# Patient Record
Sex: FEMALE | Race: WHITE
[De-identification: names, ages, dates, MRNs, and addresses within clinical notes are randomized per-mention and may not be internally consistent; named-entity substitution may affect disease eponyms.]

---

## 2018-09-15 ENCOUNTER — HOSPITAL ENCOUNTER (EMERGENCY)
Dept: HOSPITAL 46 - ED | Age: 53
Discharge: HOME | End: 2018-09-15
Payer: MEDICARE

## 2018-09-15 VITALS — WEIGHT: 189.99 LBS | BODY MASS INDEX: 33.66 KG/M2 | HEIGHT: 63 IN
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
## 2018-09-15 DIAGNOSIS — S63.501A: Primary | ICD-10-CM
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
## 2018-09-15 PROCEDURE — 2W3CX1Z IMMOBILIZATION OF RIGHT LOWER ARM USING SPLINT: ICD-10-PCS

## 2019-04-04 ENCOUNTER — HOSPITAL ENCOUNTER (INPATIENT)
Dept: HOSPITAL 46 - MS | Age: 54
LOS: 1 days | Discharge: HOME | DRG: 389 | End: 2019-04-05
Attending: SURGERY | Admitting: SURGERY
Payer: MEDICARE

## 2019-04-04 VITALS — BODY MASS INDEX: 32.12 KG/M2 | WEIGHT: 181.31 LBS | HEIGHT: 63 IN

## 2019-04-04 DIAGNOSIS — G89.29: ICD-10-CM

## 2019-04-04 DIAGNOSIS — J30.9: ICD-10-CM

## 2019-04-04 DIAGNOSIS — G62.9: ICD-10-CM

## 2019-04-04 DIAGNOSIS — E03.9: ICD-10-CM

## 2019-04-04 DIAGNOSIS — Z79.52: ICD-10-CM

## 2019-04-04 DIAGNOSIS — C56.9: ICD-10-CM

## 2019-04-04 DIAGNOSIS — Z87.891: ICD-10-CM

## 2019-04-04 DIAGNOSIS — K57.90: ICD-10-CM

## 2019-04-04 DIAGNOSIS — C79.9: ICD-10-CM

## 2019-04-04 DIAGNOSIS — Z90.710: ICD-10-CM

## 2019-04-04 DIAGNOSIS — K56.600: Primary | ICD-10-CM

## 2019-04-04 DIAGNOSIS — T45.1X5D: ICD-10-CM

## 2019-04-04 DIAGNOSIS — Z79.899: ICD-10-CM

## 2019-04-04 DIAGNOSIS — R60.0: ICD-10-CM

## 2019-04-04 DIAGNOSIS — I10: ICD-10-CM

## 2019-04-04 DIAGNOSIS — F32.9: ICD-10-CM

## 2019-04-04 DIAGNOSIS — F41.9: ICD-10-CM

## 2019-04-04 DIAGNOSIS — G43.909: ICD-10-CM

## 2019-04-04 DIAGNOSIS — Z79.891: ICD-10-CM

## 2019-04-04 DIAGNOSIS — B49: ICD-10-CM

## 2019-04-04 PROCEDURE — C9113 INJ PANTOPRAZOLE SODIUM, VIA: HCPCS

## 2019-04-04 NOTE — NUR
PT REPORTING HEADACHE. TYLENOL SUPPOSITORY ADMINISTERED. 0.5MG IV DILAUDID FOR
ABDOMNIAL PAIN, PT WITH NAUSEA, ZOFRAN ADMINISTERED.

## 2019-04-04 NOTE — NUR
PT ARRIVED TO FLOOR VIA STRETCHER, FROM [facility] WITH 2L MASK IN
PLACE, NG TUBE CLAMPED. PT WITH 7/10 ABDOMINAL PAIN. 0.5 MG DILAUDID
ADMINISTERED. NG TUBE CONNECTED TO LIWS. PT IS AAO X3. ORIENTED TO ROOM, D5LR
 STARTED. CALL LIGHT IN REACH. ICE SHIPS PROVIDED PER ORDER. DENIES
FURTHER NEEDS.

## 2019-04-04 NOTE — NUR
DIRRECT ADMIT FOR SBO. D5LR . ZOFRAN FOR NAUSEA, DILAUDID FOR ABDOMINAL
PAIN. TYLENOL FOR HEADACHE. PT WITH FREQUENCY TO URINATE. TENDER ABDOMEN,
HYPOACTIVE BOWELS. NPO. NG TUBE TO LIWS. MARKEDAT THR 52CM. 16F. 100 ML OUT
THIS SHIFT. PT TO FLOOR AT 1400.

## 2019-04-04 NOTE — NUR
PT WITH 5/10 ABDOMINAL PAIN. REFUSED MEDICAITON. HEAT PACK APPLIED TO
ABDOPMEN, ICE PACK TO NECK FOR HEADACHE. DENIES FURTHER NEEDS.

## 2019-04-05 NOTE — NUR
PATIENT RESTING IN BED. DAUGHTER IN ROOM. VITAL SIGNS AND I&O DONE. CALL LIGHT
WITHIN REACH. NO OTHER NEEDS AT THIS TIME

## 2019-04-05 NOTE — NUR
PATIENT SITTING UP IN BED. DAUGHTER IN ROOM. FINAL VITAL SIGNS WERE OBTAINED
PRIOR TO DISCHARGE FROM THE UNIT

## 2019-04-05 NOTE — NUR
IV FLUID BOLUS COMPLETED. IV DC'D INTACT AT THIS TIME, NG DC, PT TOLERATED
WELL. DAUGHTER IN ROOM AND HELPING PT DRESS. WILL CALL WHEN READY FOR DC.

## 2019-04-05 NOTE — NUR
PATIENT RESTING IN BED. DAUGHTER IN ROOM. SETS UP BATHROOM FOR SHOWER. PATIENT
GOES TO BATHROOM TO TAKE A SHOWER. LINENS CHANGED. NO OTHER NEEDS AT THIS TIME

## 2019-04-05 NOTE — NUR
PATIENT RESTING IN BED. FAMILY IN ROOM. VITAL SIGNS AND I&O DONE. PATIENT DID
NOT VOID DURING THIS PERIOD. RN NOTIFIED. CALL LIGHT WITHIN REACH. NO OTHER
NEEDS AT THIS TIME

## 2019-04-05 NOTE — NUR
PT CONTINUES TO BE NPO X ICE CHIPS, DOES OWN MOUTH CARE. NGT L NARE INTACT,
PATENT TOP LIWS, DRAINING 200CC BROWN-GREEN DRAINAGE. UP TO BR VOIDED.
DARK ORANGE, STRONG SMELLING URINE, SAMPLE SENT TO LAB. NO C/O ABD PAIN. BACK
TO BED, TOLERATED WELL. IVF INFUSING W/O PROBLEMS, SCDS IN PLACE, HOB
ELEVATED. PT HAS SLEPT THIS SHIFT. RECEIVED VASOTC IV PER ELEVATGED BP

## 2019-04-05 NOTE — NUR
RECEIVED REPORT FROM NIGHT SHIFT RN. PT IN BED AWAKE. IS AAO X3. NG TO LISHREE.
D5LR  INFUSING. DENIES NAUSEA. PRN PAIN MEDICATION FOR PAIN
ADMINISTERED. CALL LIGHT IN REACH.SCD'S IN PLACE.

## 2019-04-05 NOTE — NUR
Coop with assessment, NGT to TITUS, ON L NARE. PATENT, DRAINING DARK BROWN
COLORED DRAINAGE. ABD DISTENDED, TIMPANIC BOWEL SOUNDS. IVF INFUSING W/O
PROBLEMS. CONTINUES TO BE NPO WITH ICE CHIPS Q8H. DENIES NEED TO GET UP TO BR
AT THIS TIME. SCDS IN PLACE. FAMILY IN ROOM

## 2019-04-05 NOTE — CONS
[facility]
                                    [address], [place]  [address]
_________________________________________________________________________________________
                                                                 Signed   
 
 
DATE OF CONSULTATION:
04/04/2019
 
CHIEF COMPLAINT:
Nausea and vomiting.
 
HISTORY OF PRESENT ILLNESS:
Jamie is a 53-year-old female who went through a robotic assisted total abdominal
hysterectomy back in 2015 for ovarian cancer with Dr. Blas in [place], [place].  In
the meantime, she is now on her third round of chemotherapy with Dr. Herrera in the
[facility].  The family tells me it is
recurred in the abdomen and most likely in the liver as well.  At one point, she did
have an abdominal port catheter for intraabdominal chemotherapy, but that has been
removed.  She now has a port catheter on the right side of her chest.  She has been in
the [facility] at least 3 times for the last week or so with what appears
to be a recurring if not persistent partial small bowel obstruction.  She usually
responds quite nicely to IV fluids and NG tube decompression.  She has had two CT scans
at [facility] and I did review those records.  No obvious transition
point and no obvious mention of any recurrent ovarian cancer, particularly with respect
to the liver.  For reasons that are unclear, she was asked to be transferred up to our
25-bed critical access hospital.  No mention of any ovarian cancer was undertaken during
our conversation.  The family had asked me why she had not gone to the [facility] to be
with a medical oncologist.  Apparently, the medical oncologist is difficult to get a
hold of.  In the meantime, she seems to be hemodynamically stable and she does have
moderately dark bilious fluid coming out of her NG tube.  Her  and two daughters
are with her currently along with grandchildren. 
 
PAST MEDICAL HISTORY:
Some type of rash on her thighs, her arms, and so forth.  She has stage IV ovarian
cancer and has been undergoing chemotherapy with Dr. Herrera associated with [facility].  She has hypothyroidism, anxiety, depression, migraine
headaches, allergic rhinitis, lower extremity edema, possible urinary tract infection,
newly diagnosed hypertension, chronic pain, and diverticulosis. 
 
PAST SURGICAL HISTORY:
Laparoscopic cholecystectomy, open appendectomy through a lower midline incision,
robotic-assisted total abdominal hysterectomy in 2015 with Dr. Blas, removal of her
abdominal port catheter with persistent right chest wall port catheter.  She has had a
bilateral tubal ligation, colonoscopy in 2018, and tonsillectomy. 
 
SOCIAL HISTORY:
She has quit smoking.  She does not drink.  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
[place], [place].  She has now been on disability for the last 2 years.  She is 
to her , James, at #[phone].  She has three children, one is Iman Romo
 
    Electronically Signed By: OSMANI DOWNS MD  04/05/19 0615
_________________________________________________________________________________________
PATIENT NAME:     JAMIE RAMOS                       
MEDICAL RECORD #: [id number]            CONSULTATION                  
          ACCT #: [id number]  
DATE OF BIRTH:   [birth year]            REPORT #: [id number]      
PHYSICIAN:        OSMANI DOWNS MD             
PCP:              ROZINA ALDRICH PAC             
REPORT IS CONFIDENTIAL AND NOT TO BE RELEASED WITHOUT AUTHORIZATION
 
 
                                  [address]  [address]
_________________________________________________________________________________________
                                                                 Signed   
 
 
at #[phone], also Zainab Cruz at #[phone].  Her 's telephone
number is #[phone].  Her primary care provider apparently is Arturo Le.  She
prefers [facility] in [place], [place].  They reside in [place], [place]. 
 
FAMILY HISTORY:
Cousin and uncle apparently had colon cancer.
 
REVIEW OF SYSTEMS:
She had 10 systems reviewed and she gave me all the details regarding her ovarian cancer.
 
ALLERGIES:
No known drug allergies, but she apparently has hay fever and other possible
environmental allergies. 
 
MEDICATIONS:
Prednisone, levothyroxine, amitriptyline, omeprazole, Norco, amoxicillin, escitalopram,
ofloxacin ear drops, oseltamivir, terbinafine, lisinopril, and gabapentin. 
 
PHYSICAL EXAMINATION:
VITAL SIGNS:  Her blood pressure is 171/94, her heart rate is 82, respiratory rate 16,
and temperature is 97.1.  She is 96% on room air.  She is 5 feet 3 inches and weighs 181
pounds. 
GENERAL:  Jamie is a 53-year-old female who is lying supine in her hospital bed with her
 and both daughters in the room and looks like some grand children.  She is
alert, awake, and interactive.  She still has good muscle mass.  She actually is a good
historian.  She does not appear to be in any acute distress.  I do see a moderately dark
green bile coming out of her NG tube. 
LUNGS:  Clear to auscultation. 
HEART:  Regular rate and rhythm. 
ABDOMEN:  Clearly distended.  It is moderately firm and feels full.  No dominant mass
noted.  No incisional hernias. 
 
LABORATORY DATA:
Her white blood count is 5.7, hemoglobin is 14.8, and neutrophils 47.  BUN 11 and
creatinine 1.0.  Her amylase is 40 and alkaline phosphatase is 59.  Liver function tests
are negative.  Her albumin is 4.4. 
 
RADIOGRAPHIC STUDIES:
Two CT scans from [facility] were reviewed that is in reports.  She
apparently had a small bowel obstruction that seems to have resolved.  No mention of any
lesions in the liver or elsewhere in the abdomen. 
 
ASSESSMENT AND PLAN:
Jamie is a 53-year-old female who presents with what appears to be stage IV ovarian cancer
 
    Electronically Signed By: OSMANI DOWNS MD  04/05/19 0615
_________________________________________________________________________________________
PATIENT NAME:     JAMIE RAMOS                       
MEDICAL RECORD #: [id number]            CONSULTATION                  
          ACCT #: [id number]  
DATE OF BIRTH:   [birth year]            REPORT #: [id number]      
PHYSICIAN:        OSMANI DOWNS MD             
PCP:              ROZINA ALDRICH PAC             
REPORT IS CONFIDENTIAL AND NOT TO BE RELEASED WITHOUT AUTHORIZATION
 
 
                                  [facility]
                                    [address], [place]  [address]
_________________________________________________________________________________________
                                                                 Signed   
 
 
with at least a partial small bowel obstruction.  At this point, she is admitted and we
will follow along with some conservative therapy with an NG tube and IV fluids.  I let 
Jamie and her family know on-call our medical oncologist tomorrow in the morning.  They
have expressed understanding and agreed with above plan. 
 
 
 
            ________________________________________
            Osmani Downs MD 
 
 
ALB/MODL
Job #:  [id number]
DD:  04/04/2019 20:08:53
DT:  04/04/2019 22:09:16
 
cc:            MD Jermaine Limon MD Daniel C Hambleton, MD Russel J Nichols, MD
 
 
Copies:  OSMANI DOWNS MD, BASIR MD HAMBLETON, DANIEL C MD NICHOLS, RUSSEL J MD
~
 
 
 
 
 
 
 
 
 
 
 
 
 
 
 
    Electronically Signed By: OSMANI DOWNS MD  04/05/19 0615
_________________________________________________________________________________________
PATIENT NAME:     JAMIE RAMOS                       
MEDICAL RECORD #: [id number]            CONSULTATION                  
          ACCT #: [id number]  
DATE OF BIRTH:   [birth year]            REPORT #: [id number]      
PHYSICIAN:        OSMANI DOWNS MD             
PCP:              ROZINA ALDRICH PAC             
REPORT IS CONFIDENTIAL AND NOT TO BE RELEASED WITHOUT AUTHORIZATION

## 2019-04-05 NOTE — NUR
PT SITTING UP IN BED, NG TUBE AND COPING AS WELL AS COULD BE EXPECTED.
PT'S DAUGHTER IN VISITING. PT REQUESTED PRAYER, ANUJA FOLLOW AS NEEDED

## 2019-04-06 NOTE — DS
[facility]
                                    [address], [place]  [address]
_________________________________________________________________________________________
                                                                 Signed   
 
 
ADMISSION DATE:  04/04/2019
 
DISCHARGE DATE:  04/05/2019
 
FINAL DIAGNOSES:
1. Small bowel obstruction.
2. Metastatic ovarian cancer.
 
PROCEDURES:
None.
 
HISTORY OF PRESENT ILLNESS:
Anai is a 53-year-old female who underwent her robotic-assisted hysterectomy with Dr. Jignesh Avila in 2015 in [place], [place].  She had a Port-A-Catheter placed in the
abdomen, one in the right chest wall.  She had both systemic chemotherapy and
intraabdominal chemotherapy.  As is common with ovarian cancer, responded quite nicely
early on.  Unfortunately, she had come back and she is now into her third round of
chemo.  The abdominal Port-A-Catheter has long since been removed.  She [place]
hour or [place] out [place] the country in [place], [place].  She had been having trouble last week
or so with recurring partial small bowel obstruction.  She had been to [facility] in [place], [place].  She had two CT scans done and there were no obvious masses
in the liver or otherwise in the lymph node seemed to be fine and there was no obvious
transition point.  I had been called at [facility] here in [place], [place]
to accept her in transfer.  Unfortunately, there was no discussion of ovarian cancer
over the telephone. 
 
HOSPITAL COURSE:
Anai was admitted as above and she has had clear bilious fluid at her NG tube.  Her
urine output has been a little on the low side, so we are going to give her some extra
IV fluids.  She has had no flatus.  Her abdomen is mild-to-moderately distended and has
a kind of doughy feeling, but no fluid wave.  There is no specific mass palpable.  No
incisional hernias.  I had a long discussion with Anai and her family including her
two daughters and her .  Their medical oncologist is Dr. Jermaine Herrera associated
with [facility] in [facility] about an hour from our hospital.  It
was already late in the evening, so this morning I spoke with Dr. Herrera personally and
of course, he very kindly asked that she be transferred over to [facility] for ongoing evaluation and treatment.  From our discussion, it sounds like she is
probably not a candidate for any additional surgeries.  Anai and her daughter feel
comfortable, she can travel by private vehicle.  We are going to go ahead and discharge
__________ Emergency Room over at [facility] per Dr. Herrera's advice.  Consequently, we were
not going to provide any new medications or prescriptions.  We have been happy to take 
care Anai and we wish her a good luck.  She and her daughter expressed understanding
 
    Electronically Signed By: OSMANI DOWNS MD  04/06/19 0816
_________________________________________________________________________________________
PATIENT NAME:     JAMIE RAMOS                       
MEDICAL RECORD #: [id number]            DISCHARGE SUMMARY             
          ACCT #: [id number]  
DATE OF BIRTH:   [birth year]            REPORT #: [id number]      
PHYSICIAN:        OSMANI DOWNS MD             
PCP:              NO PRIMARY CARE PHYSICIAN     
REPORT IS CONFIDENTIAL AND NOT TO BE RELEASED WITHOUT AUTHORIZATION
 
 
                                  [facility]
                                    [address] [place] [address]  [address]
_________________________________________________________________________________________
                                                                 Signed   
 
 
and agreed with above plan. 
 
 
 
            ________________________________________
            Osmani Downs MD 
 
 
ALB/MODL
Job #:  [id number]
DD:  04/05/2019 13:04:43
DT:  04/06/2019 02:25:23
 
cc:            MD Tanvi Evans, MD Osmani Downs, MD Jermaine Herrera MD
 
 
Copies:  JIGNESH AVILA MD,TANVI DOWNS,JERMAINE IVAN MD, MD
~
 
 
 
 
 
 
 
 
 
 
 
 
 
 
 
 
    Electronically Signed By: OSMANI DOWNS MD  04/06/19 0816
_________________________________________________________________________________________
PATIENT NAME:     JAMIE RAMOS                       
MEDICAL RECORD #: [id number]            DISCHARGE SUMMARY             
          ACCT #: [id number]  
DATE OF BIRTH:   [birth year]            REPORT #: [id number]      
PHYSICIAN:        OSMANI DOWNS MD             
PCP:              NO PRIMARY CARE PHYSICIAN     
REPORT IS CONFIDENTIAL AND NOT TO BE RELEASED WITHOUT AUTHORIZATION